# Patient Record
Sex: MALE | Race: WHITE | NOT HISPANIC OR LATINO | Employment: FULL TIME | URBAN - METROPOLITAN AREA
[De-identification: names, ages, dates, MRNs, and addresses within clinical notes are randomized per-mention and may not be internally consistent; named-entity substitution may affect disease eponyms.]

---

## 2019-08-05 ASSESSMENT — ANXIETY QUESTIONNAIRES
5. BEING SO RESTLESS THAT IT IS HARD TO SIT STILL: NOT AT ALL
GAD7 TOTAL SCORE: 0
1. FEELING NERVOUS, ANXIOUS, OR ON EDGE: NOT AT ALL
6. BECOMING EASILY ANNOYED OR IRRITABLE: NOT AT ALL
2. NOT BEING ABLE TO STOP OR CONTROL WORRYING: NOT AT ALL
7. FEELING AFRAID AS IF SOMETHING AWFUL MIGHT HAPPEN: NOT AT ALL
4. TROUBLE RELAXING: NOT AT ALL
3. WORRYING TOO MUCH ABOUT DIFFERENT THINGS: NOT AT ALL
GAD7 TOTAL SCORE: 0
7. FEELING AFRAID AS IF SOMETHING AWFUL MIGHT HAPPEN: NOT AT ALL

## 2019-08-05 ASSESSMENT — ENCOUNTER SYMPTOMS
RECTAL PAIN: 0
ARTHRALGIAS: 0
MUSCLE WEAKNESS: 0
HEARTBURN: 1
VOMITING: 0
MYALGIAS: 0
BLOOD IN STOOL: 0
JAUNDICE: 0
DIARRHEA: 0
STIFFNESS: 0
BOWEL INCONTINENCE: 0
BACK PAIN: 0
MUSCLE CRAMPS: 0
JOINT SWELLING: 0
NECK PAIN: 1
BLOATING: 0
NAUSEA: 0
ABDOMINAL PAIN: 0
CONSTIPATION: 0

## 2019-08-06 ENCOUNTER — VIRTUAL VISIT (OUTPATIENT)
Dept: INTERNAL MEDICINE | Facility: CLINIC | Age: 49
End: 2019-08-06
Payer: COMMERCIAL

## 2019-08-06 DIAGNOSIS — Z00.00 ENCOUNTER FOR PREVENTIVE HEALTH EXAMINATION: Primary | ICD-10-CM

## 2019-08-06 RX ORDER — CETIRIZINE HYDROCHLORIDE 10 MG/1
TABLET ORAL EVERY 24 HOURS
COMMUNITY
Start: 2010-08-10

## 2019-08-06 ASSESSMENT — ANXIETY QUESTIONNAIRES: GAD7 TOTAL SCORE: 0

## 2019-08-06 NOTE — PROGRESS NOTES
Health Maintenance:  Do you have a PCP? No  When was your last visit with your PCP?   When was your last eye exam? 6 months  Have you ever had a colonoscopy? No  If yes, when?   Have you ever had any polyps removed?     30+ Pack Year Smoking History:  Have you ever had a chest CT to screen for cancer? No    If Male:  (65 years old+ and tobacco use) Have you ever completed an ultrasound of your abdominal aorta? No    As part of your visit we will set up a DEXA scan which will measure your body composition. We have a few questions that need to be answered before we can schedule this scan:   What is your approximate weight? 165   Have you ever had a DEXA scan within the past 2 years? No   Will you have any other imaging studies with contrast (x-ray, CT scan) within 7  days of this appointment? No   Have you had any spine or hip surgery? No   Do you take any vitamins that contain calcium or antacids with calcium? No    If yes, stop taking 24 hours prior to visit.     Goals for the Visit:  1. Thorough Comprehensive Preventative Exam  2. Shingles outbreak 2 years ago  3.   Pertinent past Medical/Family and Social HX:   Pertinent sx that desire are addressed with this visit: Neck Pain    Instructions prior to appointment:   1. Fast beginning at 10 pm for lab appointment  2. If your preventive care assessment package includes a Fitness Assessment, please bring athletic shoes. Complementary [facility] fitness attire is provided and yours to keep.  3. If eye exam, eyes may be dilated, it will last 4-6 hours, may want to bring sunglasses.   4. May bring laptop or other work materials for use during downtime.   5. You will receive an email about 3 days prior to your visit with a final itinerary, menu selections for the complementary breakfast and lunch and instructions for the visit.     Complimentary  Parking provided. Drop off car in front [facility], take the patient elevators  to the [facility]. When you enter in the lobby, identify yourself as an [facility] [atient and you will be escorted up to the clinic.   If questions arise prior to your appointment please contact the clinic at [phone].

## 2019-08-14 ENCOUNTER — APPOINTMENT (OUTPATIENT)
Dept: INTERNAL MEDICINE | Facility: CLINIC | Age: 49
End: 2019-08-14
Payer: COMMERCIAL

## 2019-08-14 ENCOUNTER — OFFICE VISIT (OUTPATIENT)
Dept: AUDIOLOGY | Facility: CLINIC | Age: 49
End: 2019-08-14
Payer: COMMERCIAL

## 2019-08-14 ENCOUNTER — OFFICE VISIT (OUTPATIENT)
Dept: INTERNAL MEDICINE | Facility: CLINIC | Age: 49
End: 2019-08-14
Payer: COMMERCIAL

## 2019-08-14 ENCOUNTER — OFFICE VISIT (OUTPATIENT)
Dept: GASTROENTEROLOGY | Facility: CLINIC | Age: 49
End: 2019-08-14
Payer: COMMERCIAL

## 2019-08-14 ENCOUNTER — ANCILLARY PROCEDURE (OUTPATIENT)
Dept: BONE DENSITY | Facility: CLINIC | Age: 49
End: 2019-08-14
Payer: COMMERCIAL

## 2019-08-14 ENCOUNTER — OFFICE VISIT (OUTPATIENT)
Dept: DERMATOLOGY | Facility: CLINIC | Age: 49
End: 2019-08-14
Payer: COMMERCIAL

## 2019-08-14 VITALS
DIASTOLIC BLOOD PRESSURE: 73 MMHG | BODY MASS INDEX: 22.76 KG/M2 | WEIGHT: 159 LBS | OXYGEN SATURATION: 99 % | HEART RATE: 71 BPM | RESPIRATION RATE: 16 BRPM | SYSTOLIC BLOOD PRESSURE: 113 MMHG | TEMPERATURE: 97.7 F | HEIGHT: 70 IN

## 2019-08-14 DIAGNOSIS — L81.4 SOLAR LENTIGINOSIS: Primary | ICD-10-CM

## 2019-08-14 DIAGNOSIS — Z00.00 ENCOUNTER FOR PREVENTIVE HEALTH EXAMINATION: Primary | ICD-10-CM

## 2019-08-14 DIAGNOSIS — Z82.49 FAMILY HISTORY OF ISCHEMIC HEART DISEASE: ICD-10-CM

## 2019-08-14 DIAGNOSIS — K21.9 GASTROESOPHAGEAL REFLUX DISEASE, ESOPHAGITIS PRESENCE NOT SPECIFIED: ICD-10-CM

## 2019-08-14 DIAGNOSIS — Z12.83 SKIN CANCER SCREENING: ICD-10-CM

## 2019-08-14 DIAGNOSIS — Z00.00 ENCOUNTER FOR PREVENTIVE HEALTH EXAMINATION: ICD-10-CM

## 2019-08-14 DIAGNOSIS — R10.13 DYSPEPSIA: ICD-10-CM

## 2019-08-14 DIAGNOSIS — Z71.82 EXERCISE COUNSELING: Primary | ICD-10-CM

## 2019-08-14 DIAGNOSIS — H90.6 MIXED HEARING LOSS, BILATERAL: Primary | ICD-10-CM

## 2019-08-14 DIAGNOSIS — Z98.890 S/P SINUS SURGERY: ICD-10-CM

## 2019-08-14 DIAGNOSIS — J30.9 ALLERGIC RHINITIS, UNSPECIFIED SEASONALITY, UNSPECIFIED TRIGGER: ICD-10-CM

## 2019-08-14 DIAGNOSIS — Z86.69 HISTORY OF MIGRAINE: ICD-10-CM

## 2019-08-14 DIAGNOSIS — D22.9 MULTIPLE PIGMENTED NEVI: ICD-10-CM

## 2019-08-14 DIAGNOSIS — Z90.89 STATUS POST TONSILLECTOMY: ICD-10-CM

## 2019-08-14 DIAGNOSIS — Z86.19 HISTORY OF HERPES ZOSTER: ICD-10-CM

## 2019-08-14 LAB
ALBUMIN UR-MCNC: NEGATIVE MG/DL
ALP SERPL-CCNC: 61 U/L (ref 40–150)
ALT SERPL W P-5'-P-CCNC: 25 U/L (ref 0–70)
APPEARANCE UR: CLEAR
BASOPHILS # BLD AUTO: 0 10E9/L (ref 0–0.2)
BASOPHILS NFR BLD AUTO: 1.1 %
BILIRUB UR QL STRIP: NEGATIVE
CHOLEST SERPL-MCNC: 209 MG/DL
COLOR UR AUTO: YELLOW
CREAT SERPL-MCNC: 1.04 MG/DL (ref 0.66–1.25)
DEPRECATED CALCIDIOL+CALCIFEROL SERPL-MC: 41 UG/L (ref 20–75)
DIFFERENTIAL METHOD BLD: ABNORMAL
EOSINOPHIL # BLD AUTO: 0.1 10E9/L (ref 0–0.7)
EOSINOPHIL NFR BLD AUTO: 1.6 %
ERYTHROCYTE [DISTWIDTH] IN BLOOD BY AUTOMATED COUNT: 13 % (ref 10–15)
GFR SERPL CREATININE-BSD FRML MDRD: 85 ML/MIN/{1.73_M2}
GLUCOSE SERPL-MCNC: 86 MG/DL (ref 70–99)
GLUCOSE UR STRIP-MCNC: NEGATIVE MG/DL
HCT VFR BLD AUTO: 47.1 % (ref 40–53)
HDLC SERPL-MCNC: 67 MG/DL
HGB BLD-MCNC: 15.7 G/DL (ref 13.3–17.7)
HGB UR QL STRIP: NEGATIVE
HIV 1+2 AB+HIV1 P24 AG SERPL QL IA: NONREACTIVE
IMM GRANULOCYTES # BLD: 0 10E9/L (ref 0–0.4)
IMM GRANULOCYTES NFR BLD: 0 %
KETONES UR STRIP-MCNC: NEGATIVE MG/DL
LDLC SERPL CALC-MCNC: 105 MG/DL
LEUKOCYTE ESTERASE UR QL STRIP: NEGATIVE
LYMPHOCYTES # BLD AUTO: 1.4 10E9/L (ref 0.8–5.3)
LYMPHOCYTES NFR BLD AUTO: 36.7 %
MCH RBC QN AUTO: 31.7 PG (ref 26.5–33)
MCHC RBC AUTO-ENTMCNC: 33.3 G/DL (ref 31.5–36.5)
MCV RBC AUTO: 95 FL (ref 78–100)
MONOCYTES # BLD AUTO: 0.5 10E9/L (ref 0–1.3)
MONOCYTES NFR BLD AUTO: 12.4 %
MUCOUS THREADS #/AREA URNS LPF: PRESENT /LPF
NEUTROPHILS # BLD AUTO: 1.8 10E9/L (ref 1.6–8.3)
NEUTROPHILS NFR BLD AUTO: 48.2 %
NITRATE UR QL: NEGATIVE
NONHDLC SERPL-MCNC: 141 MG/DL
NRBC # BLD AUTO: 0 10*3/UL
NRBC BLD AUTO-RTO: 0 /100
PH UR STRIP: 6 PH (ref 5–7)
PLATELET # BLD AUTO: 174 10E9/L (ref 150–450)
PSA SERPL-ACNC: 1.06 UG/L (ref 0–4)
RBC # BLD AUTO: 4.95 10E12/L (ref 4.4–5.9)
RBC #/AREA URNS AUTO: 0 /HPF (ref 0–2)
SOURCE: ABNORMAL
SP GR UR STRIP: 1.01 (ref 1–1.03)
SQUAMOUS #/AREA URNS AUTO: <1 /HPF (ref 0–1)
TRIGL SERPL-MCNC: 181 MG/DL
TSH SERPL DL<=0.005 MIU/L-ACNC: 2.28 MU/L (ref 0.4–4)
UROBILINOGEN UR STRIP-MCNC: 0 MG/DL (ref 0–2)
WBC # BLD AUTO: 3.8 10E9/L (ref 4–11)
WBC #/AREA URNS AUTO: <1 /HPF (ref 0–5)

## 2019-08-14 ASSESSMENT — PAIN SCALES - GENERAL
PAINLEVEL: NO PAIN (0)
PAINLEVEL: NO PAIN (0)

## 2019-08-14 ASSESSMENT — MIFFLIN-ST. JEOR: SCORE: 1602.47

## 2019-08-14 NOTE — LETTER
8/14/2019    RE: Osito Michelle  [address] [facility] [address]     Physical Fitness Assessment:    See Fitness Action Plan for information.    Khadar Arias, PhD.  Exercise Physiologist  Fitness     Khadar Arias, PhD

## 2019-08-14 NOTE — LETTER
8/14/2019     RE: Osito Michelle  [address]  [facility] [address]     Dear Colleague,    Thank you for referring your patient, Osito Michelle, to the [facility]. Please see a copy of my visit note below.    Physical Fitness Assessment:    See Fitness Action Plan for information.    Khadar Arias, PhD.  Exercise Physiologist  Fitness     Again, thank you for allowing me to participate in the care of your patient.      Sincerely,    Khadar Arias, PhD

## 2019-08-14 NOTE — NURSING NOTE
Chief Complaint   Patient presents with     Physical     Patient is here for annual physical     Kami Molina CMA 7:03 AM on 8/14/2019.

## 2019-08-14 NOTE — PROGRESS NOTES
" History and Physical Examination     SUBJECTIVE: Chief complaint: preventive health review.     Past Medical History:  1.  GERD  2.  Allergic rhinitis and conjunctivitis  3.  History of recurrent sinusitis, status post sinus surgery with septoplasty, circa   4.  History of perforated left tympanic membrane, managed conservatively  5.  History of migraines with isolated ocular as well as headache episodes  6.  Status post tonsillectomy, childhood  7.  History of zoster during period of pronounced stress  8.  Erectile dysfunction    Adverse Drug Reactions: Penicillin (urticaria)     Current Medications:  Unspecified probiotic supplement, one capsule daily  Cetirizine, 10 mg daily as needed  Fluticasone nasal inhaler, 1-2 sprays to each nostril daily as needed  Tadalafil, 2.5-10 mg daily as needed  Ranitidine, 150 mg twice a day  Fish oil, 2 capsules daily  Daily multivitamin supplement    Habits:  Tobacco: Never  Alcohol: 3-4 servings per day with infrequent use of larger quantities  Caffeine: 2 servings per day of coffee or tea     Social History:  in 2017 and father of 2 children: daughter Shalonda, age 17, who plans to attend [employer] after completing high school next spring; and son Yan, age 15, who enjoys football, basketball, and golf.  Osito was born in [place], raised in [place], and attended the [employer] in [place] for his BA and K[employer] degrees.  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  He enjoys spending time with his girlfriend, playing hockey, and biking.  He exercises 6 days per week, playing hockey once per week, \"spinning\" on a [employer] cycle, and working out with weights.  He plays golf occasionally.    Family History: Father  in 2017 at age 71, with history of coronary artery disease (first " myocardial infarction in his 40s), cognitive decline attributed to multi-infarct infarct dementia, hypertension, dyslipidemia, and tobacco use.  Mother is 72, with history of irritable bowel syndrome and tobacco use.  Limited contact with brother 4 years his senior, who has an unspecified dysrhythmia and history of tobacco, alcohol, and street drug abuse.  A sister 5 years his lavelle has unspecified mild valvular disorder and overweight.  Medical history of grandparents notable only for paternal grandfather's death from cancer of the duodenum.  No family history of colon cancer or polyps.     Review of Systems: Infrequent neck discomfort, managed with chiropractic manipulation and attention to posture.  History of zoster in 2017, during a period of pronounced stress related to divorce proceedings and his father's illness and eventual death.  He reports that he has noted recurrence of symptoms of GERD on the several occasions when he discontinued use of ranitidine.  No history of either EGD or colonoscopy.  Most recent tetanus (Tdap) was administered in 12/2009.  Remainder of complete review of systems was negative.     OBJECTIVE:     Vital signs: Height 70 inches.  Weight 159 pounds.  Blood pressure 116/77 on average of 3 automated readings.  Heart rate 71.  Respiratory rate 16.  Temperature 97.7 degrees.  O2 saturation 99% on room air.  General: Alert, neatly dressed and groomed, in no acute distress.  HEENT: Atraumatic and normocephalic. Eyelids, pupils, and conjunctivae appeared normal. Lips, teeth and gums appear normal.  Oropharynx showed moist mucous membranes, without exudate or erythema.  Neck: Supple, without thyromegaly, mass, or bruit. No cervical or supraclavicular lymphadenopathy.  Back: No spinal or costovertebral angle tenderness.  Chest: Clear to auscultation and percussion. Normal respiratory effort.  Cardiovascular: No jugular venous distention. Regular rate and rhythm, normal S1, S2 without  "murmur.  Abdomen: Bowel sounds positive; soft, nontender, without rebound, guarding, hepatosplenomegaly or mass.  Extremities: No cyanosis or edema.  Genitalia: Normal male genitalia, without scrotal mass or hernia. No inguinal lymphadenopathy.  Rectal: Normal tone, with smooth, nontender, nonenlarged prostate. No rectal mass.  Skin: Examination was deferred; full evaluation was completed earlier in day through dermatology clinic.  Neurologic: Cranial nerves II-XII were grossly intact. Sensory and motor examinations were normal. Normal gait.  Mini-Cog score was 5/5.  Psychiatric: Alert and oriented ×3. Normal affect. Judgment and insight intact.    Creatinine 1.04, alkaline phosphatase 61, ALT 25, cholesterol 209, triglycerides 181, HDL 67, , cholesterol/HDL 3.1, TSH 2.28, 25-hydroxy vitamin D 41, PSA 1.06, glucose 86, white blood cell count 3800, ANC 1800, hemoglobin 15.7, platelets 174,000, HIV nonreactive, urinalysis unremarkable.    EKG was normal.  Spirometry showed an FEV1 of 4.22, with an FVC of 5.88; readings were 105% and 115% of predicted values, respectively.    Preliminary DEXA results show normal bone density, with most negative and valid Z-score of -0.4 at the level of the left femoral neck.  Body composition analysis showed 14.8% fat (5th percentile); body mass index was 22.8.     ASSESSMENT:    1.  GERD.  We reviewed the usual \"anti-reflux\" measures, which he was encouraged to follow.  Based on his report of long-standing symptoms, with recurrence with multiple attempts to discontinue treatment, I recommended Helicobacter pylori testing followed by EGD.  Further recommendations will be based on those results.    2.  Family history of premature coronary artery disease.  Using guidelines from the American Heart Association and American College of Cardiology, his estimated 10-year risk of a vascular event is 1.6% (optimal for his age/gender cohort is 1.5%).  He was congratulated for his regimen " of regular exercise, encouraged to maintain ideal weight, and advised to follow a healthful diet, the elements of which were reviewed.    3.  History of zoster.  No evidence of impaired fasting glucose or significant immunosuppression.  Episode occurred during a period of enormous stress related to his divorce and his father's declining health and death.  We discussed the availability of a recombinant zoster vaccination, which I recommended at age 52.    4.  Preventive care.  Tetanus (Td) vaccination was provided at the time of his appointment, bringing his immunization status up-to-date.  We reviewed the elements of a healthful diet.  I recommended daily use of a 1000-international unit vitamin D3 supplement.  We reviewed the debate regarding benefits of multivitamin supplements and the importance of selecting a product that does not contain iron should he choose to continue.     PLAN: See above.     ~SRT

## 2019-08-14 NOTE — PROGRESS NOTES
[facility] Dermatology Note      Dermatology Problem List:  1.Skin cancer screening 8/14/19, unremarkable    Encounter Date: Aug 14, 2019    CC:  Chief Complaint   Patient presents with     Skin Check     Skin check, no concerns noted.         History of Present Illness:  Mr. Osito Michelle is a 49 year old male who presents as a new patient in the dermatology clinic today for a skin cancer screening.    Today he reports that there are not particular lesions of concern at this time, but he would like to have his skin examined as part of his yearly wellness day. He has been seen by dermatologists at outside clinics and notes that he has had moles removed but they were removed for cosmetic reasons. He received significant sun exposure in his youth but currently applies sunscreen (SPF 30) diligently with reapplication. His father had some lesions removed from his face that was precancerous in nature.     Otherwise he is feeling well, without additional skin concerns at this time.    Past Medical History:   Patient Active Problem List   Diagnosis     Allergic rhinitis     Esophageal reflux     ACP (advance care planning)     Health Care Home     Male erectile disorder     Past Medical History:   Diagnosis Date     Gastroesophageal reflux disease 2014     Migraines 2018    Had only 3 occurances     Other acne      Past Surgical History:   Procedure Laterality Date     HC CREATE EARDRUM OPENING,GEN ANESTH       HC CREATE EARDRUM OPENING,GEN ANESTH       HC REMOVE TONSILS/ADENOIDS,<15 Y/O      T & A <14y.o.       Social History:   reports that he has never smoked. He has never used smokeless tobacco. He reports that he drinks about 6.0 oz of alcohol per week. He reports that he does not use drugs.   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 Father       "Myocardial Infarction Father      Hyperlipidemia Father      Cerebrovascular Disease Father         Mini strokes     Cardiovascular Brother         rhythym     Cancer Paternal Grandfather      Stomach Cancer Paternal Grandfather      Cancer - colorectal No family hx of      Prostate Cancer No family hx of    Father had \"precancerous\" lesions removed from the face.     Medications:  Current Outpatient Medications   Medication Sig Dispense Refill     adapalene (DIFFERIN) 0.1 % gel   0     cetirizine (ZYRTEC) 10 MG tablet Take by mouth every 24 hours       fluticasone (FLONASE) 50 MCG/ACT nasal spray Spray 2 sprays into both nostrils daily 1 Package 11     Multiple Vitamin (ONE-A-DAY MENS) TABS Take 1 tablet by mouth daily.       ranitidine (ZANTAC) 150 MG tablet Take 150 mg by mouth 2 times daily       tadalafil (CIALIS) 10 MG tablet Take 0.5-1 tablets (5-10 mg) by mouth daily as needed for erectile dysfunction Never use with nitroglycerin, terazosin or doxazosin. 20 tablet 3       Allergies   Allergen Reactions     Penicillin V        Review of Systems:  -As per HPI  -Constitutional: The patient denies fatigue, fevers, chills, unintended weight loss, and night sweats.  -HEENT: Patient denies nonhealing oral sores.  -Skin: As above in HPI. No additional skin concerns.    Physical exam:  Vitals: There were no vitals taken for this visit.  GEN: This is a well developed, well-nourished male in no acute distress, in a pleasant mood.    SKIN: Full skin, which includes the head/face, both arms, chest, back, abdomen,both legs, genitalia and/or groin buttocks, digits and/or nails, was examined.  -Multiple regular brown pigmented macules and papules are identified on the trunk and extremities.   -There are a few light brown macules scattered to sun exposed areas.   -No other lesions of concern on areas examined.       Impression/Plan:  1. Multiple pigmented nevi on the trunk and extremities, some appearing slightly atypical on " the abdomen, patient will monitor.     Continue to clinically monitor    Benign nature reassured, no further intervention required at this time. ABCDES discussed.   2. Solar lentigines.     Sun precaution was advised including the use of sun screens of SPF 30 or higher, sun protective clothing, and avoidance of tanning beds.      Follow-up in 1 year, earlier for new or changing lesions.       Staff Involved:  Staff/Scribe    Scribe Disclosure:  IJason am serving as a scribe to document services personally performed by Rosalba Maya PA-C based on data collection and the provider's statements to me.     Provider Disclosure:   The documentation recorded by the scribe accurately reflects the services I personally performed and the decisions made by me.    All risks, benefits and alternatives were discussed with patient.  Patient is in agreement and understands the assessment and plan.  All questions were answered.  Sun Screen Education was given.   Return to Clinic annually or sooner as needed.   Rosalba Maya PA-C   [facility]

## 2019-08-14 NOTE — NURSING NOTE
AHA BP    1st   118/78  2nd  117/80  3rd   113/73    Average   116/77  Kami Molina [facility] 7:26 AM on 8/14/2019.

## 2019-08-14 NOTE — NURSING NOTE
Dermatology Rooming Note    Osito Michelle's goals for this visit include:   Chief Complaint   Patient presents with     Skin Check     Skin check, no concerns noted.     Bess Coronel LPN

## 2019-08-14 NOTE — LETTER
"8/14/2019    RE: Osito Michelle  [address] [facility] [address]      History and Physical Examination     SUBJECTIVE: Chief complaint: preventive health review.     Past Medical History:  1.  GERD  2.  Allergic rhinitis and conjunctivitis  3.  History of recurrent sinusitis, status post sinus surgery with septoplasty, circa 2016  4.  History of perforated left tympanic membrane, managed conservatively  5.  History of migraines with isolated ocular as well as headache episodes  6.  Status post tonsillectomy, childhood  7.  History of zoster during period of pronounced stress  8.  Erectile dysfunction    Adverse Drug Reactions: Penicillin (urticaria)     Current Medications:  Unspecified probiotic supplement, one capsule daily  Cetirizine, 10 mg daily as needed  Fluticasone nasal inhaler, 1-2 sprays to each nostril daily as needed  Tadalafil, 2.5-10 mg daily as needed  Ranitidine, 150 mg twice a day  Fish oil, 2 capsules daily  Daily multivitamin supplement    Habits:  Tobacco: Never  Alcohol: 3-4 servings per day with infrequent use of larger quantities  Caffeine: 2 servings per day of coffee or tea     Social History:  in 2017 and father of 2 children: daughter Shalonda, age 17, who plans to attend cosmetology school after completing high school next spring; and son Yan, age 15, who enjoys football, basketball, and golf.  Osito was born in [place], raised in [place], and attended the [employer] in [place] for his BA and KODI degrees.  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  He enjoys spending time with his girlfriend, playing hockey, and biking.  He exercises 6 days per week, playing hockey once per week, \"spinning\" on a [employer] cycle, and working out with weights.  He plays golf occasionally.    Family History: Father "  in 2017 at age 71, with history of coronary artery disease (first myocardial infarction in his 40s), cognitive decline attributed to multi-infarct infarct dementia, hypertension, dyslipidemia, and tobacco use.  Mother is 72, with history of irritable bowel syndrome and tobacco use.  Limited contact with brother 4 years his senior, who has an unspecified dysrhythmia and history of tobacco, alcohol, and street drug abuse.  A sister 5 years his lavelle has unspecified mild valvular disorder and overweight.  Medical history of grandparents notable only for paternal grandfather's death from cancer of the duodenum.  No family history of colon cancer or polyps.     Review of Systems: Infrequent neck discomfort, managed with chiropractic manipulation and attention to posture.  History of zoster in 2017, during a period of pronounced stress related to divorce proceedings and his father's illness and eventual death.  He reports that he has noted recurrence of symptoms of GERD on the several occasions when he discontinued use of ranitidine.  No history of either EGD or colonoscopy.  Most recent tetanus (Tdap) was administered in 2009.  Remainder of complete review of systems was negative.     OBJECTIVE:     Vital signs: Height 70 inches.  Weight 159 pounds.  Blood pressure 116/77 on average of 3 automated readings.  Heart rate 71.  Respiratory rate 16.  Temperature 97.7 degrees.  O2 saturation 99% on room air.  General: Alert, neatly dressed and groomed, in no acute distress.  HEENT: Atraumatic and normocephalic. Eyelids, pupils, and conjunctivae appeared normal. Lips, teeth and gums appear normal.  Oropharynx showed moist mucous membranes, without exudate or erythema.  Neck: Supple, without thyromegaly, mass, or bruit. No cervical or supraclavicular lymphadenopathy.  Back: No spinal or costovertebral angle tenderness.  Chest: Clear to auscultation and percussion. Normal respiratory effort.  Cardiovascular: No jugular  "venous distention. Regular rate and rhythm, normal S1, S2 without murmur.  Abdomen: Bowel sounds positive; soft, nontender, without rebound, guarding, hepatosplenomegaly or mass.  Extremities: No cyanosis or edema.  Genitalia: Normal male genitalia, without scrotal mass or hernia. No inguinal lymphadenopathy.  Rectal: Normal tone, with smooth, nontender, nonenlarged prostate. No rectal mass.  Skin: Examination was deferred; full evaluation was completed earlier in day through dermatology clinic.  Neurologic: Cranial nerves II-XII were grossly intact. Sensory and motor examinations were normal. Normal gait.  Mini-Cog score was 5/5.  Psychiatric: Alert and oriented ×3. Normal affect. Judgment and insight intact.    Creatinine 1.04, alkaline phosphatase 61, ALT 25, cholesterol 209, triglycerides 181, HDL 67, , cholesterol/HDL 3.1, TSH 2.28, 25-hydroxy vitamin D 41, PSA 1.06, glucose 86, white blood cell count 3800, ANC 1800, hemoglobin 15.7, platelets 174,000, HIV nonreactive, urinalysis unremarkable.    EKG was normal.  Spirometry showed an FEV1 of 4.22, with an FVC of 5.88; readings were 105% and 115% of predicted values, respectively.    Preliminary DEXA results show normal bone density, with most negative and valid Z-score of -0.4 at the level of the left femoral neck.  Body composition analysis showed 14.8% fat (5th percentile); body mass index was 22.8.     ASSESSMENT:    1.  GERD.  We reviewed the usual \"anti-reflux\" measures, which he was encouraged to follow.  Based on his report of long-standing symptoms, with recurrence with multiple attempts to discontinue treatment, I recommended Helicobacter pylori testing followed by EGD.  Further recommendations will be based on those results.    2.  Family history of premature coronary artery disease.  Using guidelines from the American Heart Association and American College of Cardiology, his estimated 10-year risk of a vascular event is 1.6% (optimal for his " age/gender cohort is 1.5%).  He was congratulated for his regimen of regular exercise, encouraged to maintain ideal weight, and advised to follow a healthful diet, the elements of which were reviewed.    3.  History of zoster.  No evidence of impaired fasting glucose or significant immunosuppression.  Episode occurred during a period of enormous stress related to his divorce and his father's declining health and death.  We discussed the availability of a recombinant zoster vaccination, which I recommended at age 52.    4.  Preventive care.  Tetanus (Td) vaccination was provided at the time of his appointment, bringing his immunization status up-to-date.  We reviewed the elements of a healthful diet.  I recommended daily use of a 1000-international unit vitamin D3 supplement.  We reviewed the debate regarding benefits of multivitamin supplements and the importance of selecting a product that does not contain iron should he choose to continue.     PLAN: See above.     ~SRT    Fabrizio Good MD

## 2019-08-14 NOTE — PATIENT INSTRUCTIONS
It was nice meeting you today.    Below are some nutrition recommendations that we discussed at your visit today:    1. Decrease and Limit alcoholic beverage to 2 drinks or less per day.    2. Increase water to consistently drink at least 48 oz-64 oz (6-8 cups) per day.    3. Increase fruit and vegetable intake to ensure that you have at least one serving of either or both at each meal.    -Add some fruit to your yogurt in the morning at breakfast.     -Consistently include some non-starchy vegetables at  dinner along with your meat and potatoes.    4. Continue eating 3 meals per day and a healthy snack such as nuts daily.    5. Continue daily probiotic especially since you have noticed an improvement in your symptoms.    6. If you find that your symptoms of loose stool and GERD return, worsen, you can keep daily food and beverage journals and track symptoms. Can keep journals using an daljit such as My Symptoms daljit or Mary Ann daljit or other daljit of your choice. Or can keep journals in a notebook or online option as well.    Bess Andrade, MS, RD, LD

## 2019-08-14 NOTE — LETTER
8/14/2019       RE: Osito Michelle  [address]  [facility] [address]     Dear Colleague,    Thank you for referring your patient, Osito Michelle, to the [facility] EXECUTIVE [facility] at [facility]. Please see a copy of my visit note below.     History and Physical Examination     SUBJECTIVE: Chief complaint: preventive health review.     Past Medical History:  1.  GERD  2.  Allergic rhinitis and conjunctivitis  3.  History of recurrent sinusitis, status post sinus surgery with septoplasty, circa 2016  4.  History of perforated left tympanic membrane, managed conservatively  5.  History of migraines with isolated ocular as well as headache episodes  6.  Status post tonsillectomy, childhood  7.  History of zoster during period of pronounced stress  8.  Erectile dysfunction    Adverse Drug Reactions: Penicillin (urticaria)     Current Medications:  Unspecified probiotic supplement, one capsule daily  Cetirizine, 10 mg daily as needed  Fluticasone nasal inhaler, 1-2 sprays to each nostril daily as needed  Tadalafil, 2.5-10 mg daily as needed  Ranitidine, 150 mg twice a day  Fish oil, 2 capsules daily  Daily multivitamin supplement    Habits:  Tobacco: Never  Alcohol: 3-4 servings per day with infrequent use of larger quantities  Caffeine: 2 servings per day of coffee or tea     Social History:  in 2017 and father of 2 children: daughter Shalonda, age 17, who plans to attend cosmetology school after completing high school next spring; and son Yan, age 15, who enjoys football, basketball, and golf.  Osito was born in [place], raised in [place], and attended the [employer] in [place] in [place] for his BA and K[employer] degrees.  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  He enjoys spending time with his girlfriend,  "playing hockey, and biking.  He exercises 6 days per week, playing hockey once per week, \"spinning\" on a [employer] cycle, and working out with weights.  He plays golf occasionally.    Family History: Father  in 2017 at age 71, with history of coronary artery disease (first myocardial infarction in his 40s), cognitive decline attributed to multi-infarct infarct dementia, hypertension, dyslipidemia, and tobacco use.  Mother is 72, with history of irritable bowel syndrome and tobacco use.  Limited contact with brother 4 years his senior, who has an unspecified dysrhythmia and history of tobacco, alcohol, and street drug abuse.  A sister 5 years his lavelle has unspecified mild valvular disorder and overweight.  Medical history of grandparents notable only for paternal grandfather's death from cancer of the duodenum.  No family history of colon cancer or polyps.     Review of Systems: Infrequent neck discomfort, managed with chiropractic manipulation and attention to posture.  History of zoster in 2017, during a period of pronounced stress related to divorce proceedings and his father's illness and eventual death.  He reports that he has noted recurrence of symptoms of GERD on the several occasions when he discontinued use of ranitidine.  No history of either EGD or colonoscopy.  Most recent tetanus (Tdap) was administered in 2009.  Remainder of complete review of systems was negative.     OBJECTIVE:     Vital signs: Height 70 inches.  Weight 159 pounds.  Blood pressure 116/77 on average of 3 automated readings.  Heart rate 71.  Respiratory rate 16.  Temperature 97.7 degrees.  O2 saturation 99% on room air.  General: Alert, neatly dressed and groomed, in no acute distress.  HEENT: Atraumatic and normocephalic. Eyelids, pupils, and conjunctivae appeared normal. Lips, teeth and gums appear normal.  Oropharynx showed moist mucous membranes, without exudate or erythema.  Neck: Supple, without thyromegaly, mass, or " "bruit. No cervical or supraclavicular lymphadenopathy.  Back: No spinal or costovertebral angle tenderness.  Chest: Clear to auscultation and percussion. Normal respiratory effort.  Cardiovascular: No jugular venous distention. Regular rate and rhythm, normal S1, S2 without murmur.  Abdomen: Bowel sounds positive; soft, nontender, without rebound, guarding, hepatosplenomegaly or mass.  Extremities: No cyanosis or edema.  Genitalia: Normal male genitalia, without scrotal mass or hernia. No inguinal lymphadenopathy.  Rectal: Normal tone, with smooth, nontender, nonenlarged prostate. No rectal mass.  Skin: Examination was deferred; full evaluation was completed earlier in day through dermatology clinic.  Neurologic: Cranial nerves II-XII were grossly intact. Sensory and motor examinations were normal. Normal gait.  Mini-Cog score was 5/5.  Psychiatric: Alert and oriented ×3. Normal affect. Judgment and insight intact.    Creatinine 1.04, alkaline phosphatase 61, ALT 25, cholesterol 209, triglycerides 181, HDL 67, , cholesterol/HDL 3.1, TSH 2.28, 25-hydroxy vitamin D 41, PSA 1.06, glucose 86, white blood cell count 3800, ANC 1800, hemoglobin 15.7, platelets 174,000, HIV nonreactive, urinalysis unremarkable.    EKG was normal.  Spirometry showed an FEV1 of 4.22, with an FVC of 5.88; readings were 105% and 115% of predicted values, respectively.    Preliminary DEXA results show normal bone density, with most negative and valid Z-score of -0.4 at the level of the left femoral neck.  Body composition analysis showed 14.8% fat (5th percentile); body mass index was 22.8.     ASSESSMENT:    1.  GERD.  We reviewed the usual \"anti-reflux\" measures, which he was encouraged to follow.  Based on his report of long-standing symptoms, with recurrence with multiple attempts to discontinue treatment, I recommended Helicobacter pylori testing followed by EGD.  Further recommendations will be based on those results.    2.  Family " history of premature coronary artery disease.  Using guidelines from the American Heart Association and American College of Cardiology, his estimated 10-year risk of a vascular event is 1.6% (optimal for his age/gender cohort is 1.5%).  He was congratulated for his regimen of regular exercise, encouraged to maintain ideal weight, and advised to follow a healthful diet, the elements of which were reviewed.    3.  History of zoster.  No evidence of impaired fasting glucose or significant immunosuppression.  Episode occurred during a period of enormous stress related to his divorce and his father's declining health and death.  We discussed the availability of a recombinant zoster vaccination, which I recommended at age 52.    4.  Preventive care.  Tetanus (Td) vaccination was provided at the time of his appointment, bringing his immunization status up-to-date.  We reviewed the elements of a healthful diet.  I recommended daily use of a 1000-international unit vitamin D3 supplement.  We reviewed the debate regarding benefits of multivitamin supplements and the importance of selecting a product that does not contain iron should he choose to continue.     PLAN: See above.     ~SRT    Again, thank you for allowing me to participate in the care of your patient.      Sincerely,    Fabrizio Good MD

## 2019-08-14 NOTE — PROGRESS NOTES
"[facility] Outpatient Medical Nutrition Therapy      Time Spent:  60 minutes  Session Type:  Individual Session  Referral Source: Executive Health Package  Reason for RD Visit:   Nutritional counseling     Nutrition Assessment:  Patient is here for initial visit with Registered Dietitian (WINTER).  Patient is a 49 y.o. male with history of GERD, migraines. Patient also stated that he has a history of irritable bowel syndrome and diarrhea.  He about a year ago, he started taking a probiotic supplement daily and now denies any further issues of diarrhea and stated he was amazed at how well it worked. He initially started with a prebiotic and had an increase in symptoms but now improved with probiotic. He stated that that was the only change he made in his intake. He reported eating the same foods pretty consistently and does not eat a lot in general. He stated that he does not eat too many vegetables so he likes have the frozen fruit and vegetable smoothie mix at lunch to get some intake of fruits and vegetables. He stated that he finds gluten causes some bloating so tries to eat less gluten containing ingredients but not gluten free in general. He reported eating less grains. Orange juice causes mouth sores and spicy foods as well as coffee cause GERD symptoms. He does drink a couple of cups of coffee per day but other times will have tea instead. He does not get any symptoms from tea. He exercises daily and overall feels his intake is healthy/eats a healthy diet. His sister owns a [employer] factory, so it is common for him to drink at least 3-4 alcoholic beverages per day (wine or old fashions). In summer he may drink more than that especially if on his boat.    Height: 70\"  Weight:  159 lbs (72 kg)  BMI: 22.8    Diet Tu0nyhe:  (usual intake)  Meal Food    Breakfast Greek or Pakistani yogurt with granola   Lunch Daily harvest frozen fruit and veggie smoothie mix and blends with oat meal and whey protein isolate " powder   Dinner Lean protein (chicken/lean pork/fish/seafood) with baked or mashed potato, sometimes a little vegetable but not regularly   Snacks Between lunch and dinner: handful of Raw almonds and pistaschios   Beverages 32 oz-48 oz water, 2 cups black coffee or tea with agave syrup   Alcohol Intake 3-4 glasses of wine or old fashions per day, sometimes more per day on his boat and in summer.      Labs:  On 8/14/19: Chol (209), LDL (105), triglycerides (181) and WBC (3.8). Others reviewed.  Pertinent Medications/vitamin and mineral supplements:   Tashia's probiotics, fish oil and multivitamin.   Food Allergies:  NKFA  Physical Activity:  Exercises everyday: 1x/week plays hockey, 1x/week uses his [employer] bike, 4x/week lifts weight and 1x/week walks/dog walks.  Estimated Nutrition Needs based on current body weight of 72:   3937-7520 calories (25-30 kcals/kg), 72g protein (1g/kg), ~1 ml/kcal or total fluids per MD.    Nutrition Diagnosis:    Food and nutrition related knowledge deficit related to lack of previous/complete recall of previous diet education for general healthful diet and pt report of hx IBS/diarrhea as evidenced by pt report and interest in diet education.    Nutrition Prescription: Recommended general healthful diet     Nutrition Intervention:    Nutrition Education/Counseling:  Provided general healthful diet nutrition education. Recommended eating balanced meals. Reviewed the Plate Method meal plan/handout and gave pt tips and suggestions for adding foods to his meals to get balanced meals with a variety of food groups. Encouraged him to include some fruits and vegetables at all meals. Told pt he can add some fruit to his yogurt and granola in the morning and consistently include some vegetables at dinner meals. Reviewed food groups with some example foods in groups. Reviewed the difference between unsaturated and saturated fats with recommendation to aim for choosing unsaturated fat over saturated  fats when possible. Additionally encouraged pt to eat adequate fiber and discussed some food sources containing fiber. Explained the difference between soluble and insoluble fiber in the role of GI health as well as role of soluble fiber for heart health in general.  Discussed adequate hydration/recommendations and encouraged pt to increase fluid to consistently drink at least 48 oz-64 oz (6-8 cups) per day. Additionally explained recommendation to limit alcoholic beverages to 2 standard drinks or less per day. Also explained this can contribute to GI symptoms such as loose stool and GERD.    Reviewed diet tips for irritable bowel syndrome with diarrhea/loose stool as well as GERD guidelines. Told pt he can aim for eating smaller more frequent meals if better tolerated than 3 meals. Reviewed foods that may contribute to GERD symptoms and not recommended if they are causing symptoms such as chocolate, caffeine, peppermint and spearmint, alcohol, spicy foods and fatty foods. Told patient if his symptoms return for either GERD or diarrhea, he could keep a food and beverage journal and track any GI symptoms. Discussed some ways in which he can keep journals such as in notebook or using a cell phone daljit such as My symptoms daljit or [employer] daljit or other online or daljit he preferences.    Discussed some lifestyle recommendations that may be helpful for relieving issues with GERD such as getting regular exercise at least 3-4 times per week, can raise the head of the bed by using a foam wedge under top part of mattress for example. Told patient that wearing loose fitting clothing may be more comfortable, and eating slowly, sitting while eating and eating in a calm and relaxed place is recommended. Additionally recommended not eating 3 hours before bedtime or lying down. Gave patient diet education handouts.    Answered patient's questions. He verbalized understanding of education provided.    Educational Materials Provided:  [facility] Daily Nutrition Plate method handout and GERD nutrition therapy.    Goals:  1. Decrease and Limit alcoholic beverage to 2 drinks or less per day.    2. Increase water to consistently drink at least 48 oz-64 oz (6-8 cups) per day.    3. Increase fruit and vegetable intake to ensure that you have at least one serving of either or both at each meal.    -Add some fruit to your yogurt in the morning at breakfast   -Consistently include some non starchy vegetables at dinner along with your meat and  potatoes.    4. Continue eating 3 meals per day and a healthy snack such as nuts daily.    5. Continue daily probiotic especially since you have noticed an improvement in your symptoms.    6. If you find that your symptoms of loose stool and GERD return, worsen, you can keep daily food and beverage journals and track symptoms. Can keep journals using an daljit such as My Symptoms daljit or Mary Ann daljit or other daljit of your choice. Or can keep journals in a notebook or online option as well.    Nutrition Monitoring and Evaluation: Will monitor adherence to nutrition recommendations at future RD visits.     Further Medical Nutrition Therapy:  PRN  Patient was encouraged to call/contact RD with any further questions.    Bess Andrade, MS, RD, LD

## 2019-08-14 NOTE — LETTER
8/14/2019       RE: Osito Michelle  [address]  [facility] [address]     Dear Colleague,    Thank you for referring your patient, Osito Michelle, to the [facility] GASTROENTEROLOGY AND IBD CLINIC at [facility]. Please see a copy of my visit note below.    [facility] Outpatient Medical Nutrition Therapy      Time Spent:  60 minutes  Session Type:  Individual Session  Referral Source: Executive Health Package  Reason for RD Visit:   Nutritional counseling     Nutrition Assessment:  Patient is here for initial visit with Registered Dietitian (WINTER).  Patient is a 49 y.o. male with history of GERD, migraines. Patient also stated that he has a history of irritable bowel syndrome and diarrhea.  He about a year ago, he started taking a probiotic supplement daily and now denies any further issues of diarrhea and stated he was amazed at how well it worked. He initially started with a prebiotic and had an increase in symptoms but now improved with probiotic. He stated that that was the only change he made in his intake. He reported eating the same foods pretty consistently and does not eat a lot in general. He stated that he does not eat too many vegetables so he likes have the frozen fruit and vegetable smoothie mix at lunch to get some intake of fruits and vegetables. He stated that he finds gluten causes some bloating so tries to eat less gluten containing ingredients but not gluten free in general. He reported eating less grains. Orange juice causes mouth sores and spicy foods as well as coffee cause GERD symptoms. He does drink a couple of cups of coffee per day but other times will have tea instead. He does not get any symptoms from tea. He exercises daily and overall feels his intake is healthy/eats a healthy diet. His sister owns a [employer] factory, so it is common for him to drink at least 3-4 alcoholic beverages per day (wine or old fashions). In summer he may drink more  "than that especially if on his boat.    Height: 70\"  Weight:  159 lbs (72 kg)  BMI: 22.8    Diet [id number]:  (usual intake)  Meal Food    Breakfast Greek or Monegasque yogurt with granola   Lunch Daily harvest frozen fruit and veggie smoothie mix and blends with oat meal and whey protein isolate powder   Dinner Lean protein (chicken/lean pork/fish/seafood) with baked or mashed potato, sometimes a little vegetable but not regularly   Snacks Between lunch and dinner: handful of Raw almonds and pistaschios   Beverages 32 oz-48 oz water, 2 cups black coffee or tea with agave syrup   Alcohol Intake 3-4 glasses of wine or old fashions per day, sometimes more per day on his boat and in summer.      Labs:  On 8/14/19: Chol (209), LDL (105), triglycerides (181) and WBC (3.8). Others reviewed.  Pertinent Medications/vitamin and mineral supplements:   Tashia's probiotics, fish oil and multivitamin.   Food Allergies:  NKFA  Physical Activity:  Exercises everyday: 1x/week plays hockey, 1x/week uses his [employer] bike, 4x/week lifts weight and 1x/week walks/dog walks.  Estimated Nutrition Needs based on current body weight of 72:   5340-0733 calories (25-30 kcals/kg), 72g protein (1g/kg), ~1 ml/kcal or total fluids per MD.    Nutrition Diagnosis:    Food and nutrition related knowledge deficit related to lack of previous/complete recall of previous diet education for general healthful diet and pt report of hx IBS/diarrhea as evidenced by pt report and interest in diet education.    Nutrition Prescription: Recommended general healthful diet     Nutrition Intervention:    Nutrition Education/Counseling:  Provided general healthful diet nutrition education. Recommended eating balanced meals. Reviewed the Plate Method meal plan/handout and gave pt tips and suggestions for adding foods to his meals to get balanced meals with a variety of food groups. Encouraged him to include some fruits and vegetables at all meals. Told pt he can add some " fruit to his yogurt and granola in the morning and consistently include some vegetables at dinner meals. Reviewed food groups with some example foods in groups. Reviewed the difference between unsaturated and saturated fats with recommendation to aim for choosing unsaturated fat over saturated fats when possible. Additionally encouraged pt to eat adequate fiber and discussed some food sources containing fiber. Explained the difference between soluble and insoluble fiber in the role of GI health as well as role of soluble fiber for heart health in general.  Discussed adequate hydration/recommendations and encouraged pt to increase fluid to consistently drink at least 48 oz-64 oz (6-8 cups) per day. Additionally explained recommendation to limit alcoholic beverages to 2 standard drinks or less per day. Also explained this can contribute to GI symptoms such as loose stool and GERD.    Reviewed diet tips for irritable bowel syndrome with diarrhea/loose stool as well as GERD guidelines. Told pt he can aim for eating smaller more frequent meals if better tolerated than 3 meals. Reviewed foods that may contribute to GERD symptoms and not recommended if they are causing symptoms such as chocolate, caffeine, peppermint and spearmint, alcohol, spicy foods and fatty foods. Told patient if his symptoms return for either GERD or diarrhea, he could keep a food and beverage journal and track any GI symptoms. Discussed some ways in which he can keep journals such as in notebook or using a cell phone daljit such as My symptoms daljit or Mary Ann daljit or other online or daljit he preferences.    Discussed some lifestyle recommendations that may be helpful for relieving issues with GERD such as getting regular exercise at least 3-4 times per week, can raise the head of the bed by using a foam wedge under top part of mattress for example. Told patient that wearing loose fitting clothing may be more comfortable, and eating slowly, sitting while  eating and eating in a calm and relaxed place is recommended. Additionally recommended not eating 3 hours before bedtime or lying down. Gave patient diet education handouts.    Answered patient's questions. He verbalized understanding of education provided.    Educational Materials Provided:  [employer] Daily Nutrition Plate method handout and GERD nutrition therapy.    Goals:  1. Decrease and Limit alcoholic beverage to 2 drinks or less per day.    2. Increase water to consistently drink at least 48 oz-64 oz (6-8 cups) per day.    3. Increase fruit and vegetable intake to ensure that you have at least one serving of either or both at each meal.    -Add some fruit to your yogurt in the morning at breakfast   -Consistently include some non starchy vegetables at dinner along with your meat and  potatoes.    4. Continue eating 3 meals per day and a healthy snack such as nuts daily.    5. Continue daily probiotic especially since you have noticed an improvement in your symptoms.    6. If you find that your symptoms of loose stool and GERD return, worsen, you can keep daily food and beverage journals and track symptoms. Can keep journals using an daljit such as My Symptoms daljit or Mary Ann daljit or other daljit of your choice. Or can keep journals in a notebook or online option as well.    Nutrition Monitoring and Evaluation: Will monitor adherence to nutrition recommendations at future RD visits.     Further Medical Nutrition Therapy:  PRN  Patient was encouraged to call/contact RD with any further questions.    Bess Andrade, MS, RD, LD

## 2019-08-14 NOTE — LETTER
8/14/2019       RE: Osito Michelle  [address]  [facility] [address]     Dear Colleague,    Thank you for referring your patient, Osito Michelle, to the [facility] DERMATOLOGY at [facility]. Please see a copy of my visit note below.    [facility] Dermatology Note      Dermatology Problem List:  1.Skin cancer screening 8/14/19, unremarkable    Encounter Date: Aug 14, 2019    CC:  Chief Complaint   Patient presents with     Skin Check     Skin check, no concerns noted.         History of Present Illness:  Mr. Osito Michelle is a 49 year old male who presents as a new patient in the dermatology clinic today for a skin cancer screening.    Today he reports that there are not particular lesions of concern at this time, but he would like to have his skin examined as part of his yearly wellness day. He has been seen by dermatologists at outside clinics and notes that he has had moles removed but they were removed for cosmetic reasons. He received significant sun exposure in his youth but currently applies sunscreen (SPF 30) diligently with reapplication. His father had some lesions removed from his face that was precancerous in nature.     Otherwise he is feeling well, without additional skin concerns at this time.    Past Medical History:   Patient Active Problem List   Diagnosis     Allergic rhinitis     Esophageal reflux     ACP (advance care planning)     Health Care Home     Male erectile disorder     Past Medical History:   Diagnosis Date     Gastroesophageal reflux disease 2014     Migraines 2018    Had only 3 occurances     Other acne      Past Surgical History:   Procedure Laterality Date     HC CREATE EARDRUM OPENING,GEN ANESTH       HC CREATE EARDRUM OPENING,GEN ANESTH       HC REMOVE TONSILS/ADENOIDS,<13 Y/O      T & A <14y.o.       Social History:   reports that he has never smoked. He has never used smokeless tobacco. He reports that he drinks about  "6.0 oz of alcohol per week. He reports that he does not use drugs.   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 Father      Myocardial Infarction Father      Hyperlipidemia Father      Cerebrovascular Disease Father         Mini strokes     Cardiovascular Brother         rhythym     Cancer Paternal Grandfather      Stomach Cancer Paternal Grandfather      Cancer - colorectal No family hx of      Prostate Cancer No family hx of    Father had \"precancerous\" lesions removed from the face.     Medications:  Current Outpatient Medications   Medication Sig Dispense Refill     adapalene (DIFFERIN) 0.1 % gel   0     cetirizine (ZYRTEC) 10 MG tablet Take by mouth every 24 hours       fluticasone (FLONASE) 50 MCG/ACT nasal spray Spray 2 sprays into both nostrils daily 1 Package 11     Multiple Vitamin (ONE-A-DAY MENS) TABS Take 1 tablet by mouth daily.       ranitidine (ZANTAC) 150 MG tablet Take 150 mg by mouth 2 times daily       tadalafil (CIALIS) 10 MG tablet Take 0.5-1 tablets (5-10 mg) by mouth daily as needed for erectile dysfunction Never use with nitroglycerin, terazosin or doxazosin. 20 tablet 3       Allergies   Allergen Reactions     Penicillin V        Review of Systems:  -As per HPI  -Constitutional: The patient denies fatigue, fevers, chills, unintended weight loss, and night sweats.  -HEENT: Patient denies nonhealing oral sores.  -Skin: As above in HPI. No additional skin concerns.    Physical exam:  Vitals: There were no vitals taken for this visit.  GEN: This is a well developed, well-nourished male in no acute distress, in a pleasant mood.    SKIN: Full skin, which includes the head/face, both arms, chest, back, abdomen,both legs, genitalia and/or groin buttocks, digits and/or nails, was examined.  -Multiple regular brown pigmented macules and papules are " identified on the trunk and extremities.   -There are a few light brown macules scattered to sun exposed areas.   -No other lesions of concern on areas examined.       Impression/Plan:  1. Multiple pigmented nevi on the trunk and extremities, some appearing slightly atypical on the abdomen, patient will monitor.     Continue to clinically monitor    Benign nature reassured, no further intervention required at this time. ABCDES discussed.   2. Solar lentigines.     Sun precaution was advised including the use of sun screens of SPF 30 or higher, sun protective clothing, and avoidance of tanning beds.      Follow-up in 1 year, earlier for new or changing lesions.       Staff Involved:  Staff/Scribe    Scribe Disclosure:  Jason HANSEN am serving as a scribe to document services personally performed by Rosalba Maya PA-C based on data collection and the provider's statements to me.     Provider Disclosure:   The documentation recorded by the scribe accurately reflects the services I personally performed and the decisions made by me.    All risks, benefits and alternatives were discussed with patient.  Patient is in agreement and understands the assessment and plan.  All questions were answered.  Sun Screen Education was given.   Return to Clinic annually or sooner as needed.   Rosalba Maya PA-C   [facility]

## 2019-08-14 NOTE — PATIENT INSTRUCTIONS
Quinn Mcneill,    Wonderful to meet you today. I think you're doing a great job with your fitness routine, so keep it up! Please let me know if you have any questions!    Khadar Arias, PhD

## 2019-08-14 NOTE — PROGRESS NOTES
Physical Fitness Assessment:    See Fitness Action Plan for information.    Khadar Arias, PhD.  Exercise Physiologist  Fitness

## 2019-08-15 LAB
EXPTIME-PRE: 8.9 SEC
FEF2575-%PRED-PRE: 77 %
FEF2575-PRE: 2.89 L/SEC
FEF2575-PRED: 3.75 L/SEC
FEFMAX-%PRED-PRE: 116 %
FEFMAX-PRE: 11.57 L/SEC
FEFMAX-PRED: 9.96 L/SEC
FEV1-%PRED-PRE: 105 %
FEV1-PRE: 4.22 L
FEV1FEV6-PRE: 72 %
FEV1FEV6-PRED: 81 %
FEV1FVC-PRE: 72 %
FEV1FVC-PRED: 80 %
FIFMAX-PRE: 9.55 L/SEC
FVC-%PRED-PRE: 115 %
FVC-PRE: 5.88 L
FVC-PRED: 5.08 L
INTERPRETATION ECG - MUSE: NORMAL

## 2019-08-15 NOTE — PROGRESS NOTES
AUDIOLOGY REPORT    SUBJECTIVE:  Osito Michelle is a 49 year old male who was seen in Audiology at the [facility] [place] for audiologic evaluation as part of the [facility] assessment.  The patient reports documented hearing loss confirmed during assessment approximately two years ago. The patient reports that he was told at that time that the left-ear hearing was poorer than the right, but the patient confirms that he does not note asymmetry in hearing between ears. The patient does note falling during wake boarding a few years ago, hitting the water hard on left side of head resulting in left eardrum perforation and so he does wonder if the additional hearing loss reported in the left ear was due to that injury. The patient does recall a history of chronic ear issues bilaterally as a child with 3 sets of ear ventilation tubes placed but does confirm that he has not suffered from middle ear issues as an adult beyond the left eardrum perforation due to injury. The patient reports a history of hearing issues in the family in that his mother, father and sister have all developed binaural hearing loss. The patient reports the presence of bilateral ringing tinnitus but denies other ear related issues, dizziness or vertigo or history of noise exposure.    OBJECTIVE:  Fall Risk Screen:  1. Have you fallen two or more times in the past year? No  2. Have you fallen and had an injury in the past year? No    Abuse Screening:  Do you feel unsafe at home or work/school? No  Do you feel threatened by someone? No  Does anyone try to keep you from having contact with others, or doing things outside of your home? No  Physical signs of abuse present? No    Otoscopic exam indicates ears are clear of cerumen bilaterally     Pure Tone Thresholds assessed using conventional audiometry with good  reliability from 250-8000 Hz bilaterally using circumaural headphones     RIGHT:  Normal sloping  to mild hearing loss, air-bone gap present 1000 Hz and 4000 Hz    LEFT:    Normal sloping to mild hearing loss, air-bone gap present 1000 Hz and 4000 Hz  *Symmetry is noted between ears for hearing thresholds    Tympanogram:    RIGHT: hypermobile eardrum mobility    LEFT:   hypermobile eardrum mobility    Reflexes (reported by stimulus ear):  RIGHT: Ipsilateral is present at normal levels  RIGHT: Contralateral is present at normal levels  LEFT:   Ipsilateral is present at normal levels  LEFT:   Contralateral is present at normal levels    Speech Reception Threshold:    RIGHT: 20 dB HL    LEFT:   20 dB HL  Word Recognition Score:     RIGHT: 100% at 65 dB HL using NU-6 recorded word list.    LEFT:   100% at 65 dB HL using NU-6 recorded word list.    ASSESSMENT:  Normal sloping to mild hearing loss bilaterally with symmetry noted between ears and with air-bone gaps present 1000 Hz and 4000 Hz bilaterally. Today s results were discussed with the patient in detail.     PLAN:    Patient was counseled regarding hearing loss and impact on communication.  Patient is not an ideal candidate for amplification at this time but can consider hearing aid trial if concerned for issues in communication.  It is recommended that the patient return at least bi-annually for monitoring of hearing status, sooner if changes in hearing or ear symptoms are noted.  Please call this clinic with questions regarding these results or recommendations.      Zeyad Chin.  Licensed Audiologist  [place] #[id number]

## 2019-08-15 NOTE — PROGRESS NOTES
Osito Michelle comes into clinic today at the request of Dr. KEELEY Good Ordering Provider for EKG.    This service provided today was under the supervising provider of the day Dr. KEELEY Good, who was available if needed.    Kami Molina

## 2019-09-09 DIAGNOSIS — N52.9 ERECTILE DYSFUNCTION, UNSPECIFIED ERECTILE DYSFUNCTION TYPE: ICD-10-CM

## 2019-09-09 DIAGNOSIS — N52.9 MALE ERECTILE DISORDER: ICD-10-CM

## 2019-09-09 RX ORDER — TADALAFIL 2.5 MG/1
2.5 TABLET ORAL DAILY PRN
Qty: 30 TABLET | Refills: 3 | Status: SHIPPED | OUTPATIENT
Start: 2019-09-09 | End: 2019-09-09

## 2019-09-09 RX ORDER — TADALAFIL 2.5 MG/1
2.5 TABLET ORAL DAILY PRN
Qty: 30 TABLET | Refills: 3 | Status: SHIPPED | OUTPATIENT
Start: 2019-09-09

## 2019-11-12 DIAGNOSIS — R10.13 DYSPEPSIA: ICD-10-CM

## 2019-11-12 PROCEDURE — 87338 HPYLORI STOOL AG IA: CPT | Performed by: INTERNAL MEDICINE

## 2019-11-13 DIAGNOSIS — R10.13 DYSPEPSIA: Primary | ICD-10-CM

## 2019-11-13 LAB — H PYLORI AG STL QL IA: NEGATIVE

## 2019-11-26 ENCOUNTER — TELEPHONE (OUTPATIENT)
Dept: GASTROENTEROLOGY | Facility: CLINIC | Age: 49
End: 2019-11-26

## 2020-08-25 DIAGNOSIS — N52.9 MALE ERECTILE DISORDER: ICD-10-CM

## 2020-08-25 DIAGNOSIS — N52.9 ERECTILE DYSFUNCTION, UNSPECIFIED ERECTILE DYSFUNCTION TYPE: ICD-10-CM

## 2020-08-27 NOTE — TELEPHONE ENCOUNTER
tadalafil (CIALIS) 2.5 MG tablet  Last Written Prescription Date:  9/9/19  Last Fill Quantity: 30,   # refills: 3  Last Office Visit : 8/14/19  Future Office visit:  none    Routing refill request to provider for review/approval because: lv 8/19  Rf? need annual appt? thanks.

## 2020-09-01 RX ORDER — TADALAFIL 2.5 MG/1
2.5 TABLET ORAL DAILY PRN
Qty: 30 TABLET | Refills: 3 | Status: CANCELLED | OUTPATIENT
Start: 2020-09-01

## 2020-11-17 ENCOUNTER — APPOINTMENT (OUTPATIENT)
Dept: GENERAL RADIOLOGY | Facility: CLINIC | Age: 50
End: 2020-11-17
Attending: PHYSICIAN ASSISTANT
Payer: COMMERCIAL

## 2020-11-17 ENCOUNTER — HOSPITAL ENCOUNTER (EMERGENCY)
Facility: CLINIC | Age: 50
Discharge: HOME | End: 2020-11-17
Attending: PHYSICIAN ASSISTANT | Admitting: PHYSICIAN ASSISTANT
Payer: COMMERCIAL

## 2020-11-17 VITALS
HEART RATE: 66 BPM | SYSTOLIC BLOOD PRESSURE: 124 MMHG | RESPIRATION RATE: 16 BRPM | TEMPERATURE: 97.9 F | DIASTOLIC BLOOD PRESSURE: 78 MMHG | OXYGEN SATURATION: 98 %

## 2020-11-17 DIAGNOSIS — R07.9 ACUTE CHEST PAIN: ICD-10-CM

## 2020-11-17 DIAGNOSIS — R91.8 PULMONARY NODULES: ICD-10-CM

## 2020-11-17 LAB
ALBUMIN SERPL-MCNC: 3.9 G/DL (ref 3.4–5)
ALP SERPL-CCNC: 63 U/L (ref 40–150)
ALT SERPL W P-5'-P-CCNC: 32 U/L (ref 0–70)
ANION GAP SERPL CALCULATED.3IONS-SCNC: 7 MMOL/L (ref 3–14)
AST SERPL W P-5'-P-CCNC: 21 U/L (ref 0–45)
BASOPHILS # BLD AUTO: 0 10E9/L (ref 0–0.2)
BASOPHILS NFR BLD AUTO: 0.2 %
BILIRUB SERPL-MCNC: 0.9 MG/DL (ref 0.2–1.3)
BUN SERPL-MCNC: 12 MG/DL (ref 7–30)
CALCIUM SERPL-MCNC: 9.7 MG/DL (ref 8.5–10.1)
CHLORIDE SERPL-SCNC: 108 MMOL/L (ref 94–109)
CO2 SERPL-SCNC: 26 MMOL/L (ref 20–32)
CREAT SERPL-MCNC: 1.01 MG/DL (ref 0.66–1.25)
DIFFERENTIAL METHOD BLD: NORMAL
EOSINOPHIL # BLD AUTO: 0 10E9/L (ref 0–0.7)
EOSINOPHIL NFR BLD AUTO: 0.5 %
ERYTHROCYTE [DISTWIDTH] IN BLOOD BY AUTOMATED COUNT: 12.9 % (ref 10–15)
GFR SERPL CREATININE-BSD FRML MDRD: 87 ML/MIN/{1.73_M2}
GLUCOSE SERPL-MCNC: 121 MG/DL (ref 70–99)
HCT VFR BLD AUTO: 42.2 % (ref 40–53)
HGB BLD-MCNC: 14.7 G/DL (ref 13.3–17.7)
IMM GRANULOCYTES # BLD: 0 10E9/L (ref 0–0.4)
IMM GRANULOCYTES NFR BLD: 0.2 %
INTERPRETATION ECG - MUSE: NORMAL
LYMPHOCYTES # BLD AUTO: 1.2 10E9/L (ref 0.8–5.3)
LYMPHOCYTES NFR BLD AUTO: 27.7 %
MCH RBC QN AUTO: 31.3 PG (ref 26.5–33)
MCHC RBC AUTO-ENTMCNC: 34.8 G/DL (ref 31.5–36.5)
MCV RBC AUTO: 90 FL (ref 78–100)
MONOCYTES # BLD AUTO: 0.5 10E9/L (ref 0–1.3)
MONOCYTES NFR BLD AUTO: 11.4 %
NEUTROPHILS # BLD AUTO: 2.5 10E9/L (ref 1.6–8.3)
NEUTROPHILS NFR BLD AUTO: 60 %
NRBC # BLD AUTO: 0 10*3/UL
NRBC BLD AUTO-RTO: 0 /100
PLATELET # BLD AUTO: 284 10E9/L (ref 150–450)
POTASSIUM SERPL-SCNC: 3.6 MMOL/L (ref 3.4–5.3)
PROT SERPL-MCNC: 7.1 G/DL (ref 6.8–8.8)
RBC # BLD AUTO: 4.7 10E12/L (ref 4.4–5.9)
SODIUM SERPL-SCNC: 141 MMOL/L (ref 133–144)
TROPONIN I SERPL-MCNC: <0.015 UG/L (ref 0–0.04)
TROPONIN I SERPL-MCNC: <0.015 UG/L (ref 0–0.04)
WBC # BLD AUTO: 4.2 10E9/L (ref 4–11)

## 2020-11-17 PROCEDURE — 85025 COMPLETE CBC W/AUTO DIFF WBC: CPT | Performed by: PHYSICIAN ASSISTANT

## 2020-11-17 PROCEDURE — 71046 X-RAY EXAM CHEST 2 VIEWS: CPT

## 2020-11-17 PROCEDURE — 80053 COMPREHEN METABOLIC PANEL: CPT | Performed by: PHYSICIAN ASSISTANT

## 2020-11-17 PROCEDURE — 99285 EMERGENCY DEPT VISIT HI MDM: CPT | Mod: 25

## 2020-11-17 PROCEDURE — 84484 ASSAY OF TROPONIN QUANT: CPT | Mod: 91 | Performed by: PHYSICIAN ASSISTANT

## 2020-11-17 PROCEDURE — 93005 ELECTROCARDIOGRAM TRACING: CPT

## 2020-11-17 ASSESSMENT — ENCOUNTER SYMPTOMS
VOMITING: 0
COUGH: 0
ABDOMINAL PAIN: 0
SHORTNESS OF BREATH: 0
NAUSEA: 0

## 2020-11-17 NOTE — ED TRIAGE NOTES
Chest discomfort today. Then worked out, excruciating pain, went down to his knees and called 911. No chest pain currently. COVID 3 weeks ago, reports not having symptoms anymore.

## 2020-11-17 NOTE — ED NOTES
Bed: ED24  Expected date: 11/17/20  Expected time: 12:12 PM  Means of arrival: Ambulance  Comments:  854 48m chest pain ETA 1222

## 2020-11-17 NOTE — ED AVS SNAPSHOT
[facility] Emergency Dept  [address] [address]  Phone: [phone]  Fax: [phone]                                    Osito Michelle   MRN: [id number]    Department: [facility] Emergency Dept   Date of Visit: 11/17/2020           After Visit Summary Signature Page    I have received my discharge instructions, and my questions have been answered. I have discussed any challenges I see with this plan with the nurse or doctor.    ..........................................................................................................................................  Patient/Patient Representative Signature      ..........................................................................................................................................  Patient Representative Print Name and Relationship to Patient    ..................................................               ................................................  Date                                   Time    ..........................................................................................................................................  Reviewed by Signature/Title    ...................................................              ..............................................  Date                                               Time          22EPIC Rev 08/18

## 2020-11-17 NOTE — ED PROVIDER NOTES
History     Chief Complaint:  Chest Pain       HPI   Osito Michelle is a 50 year old male who presents with chest pain. The patient states that he developed some mild mid chest pain while working at his computer this morning. He attributed the chest pain to acid reflux and tried taking Tums as well as Zantac without any significant improvement. He then went to do his chest workout and developed severe sharp mid chest pain that dropped him to his knees 30 minutes prior to arrival. He states that the chest pain went away immediately after it dropped him to his knees. He denies having chest pain like this in the past and called 911. Of note, the patient reports having covid earlier this year but has since recovered. Denies associated symptoms including shortness of breath, diaphoresis, nausea, vomiting. Denies radiation of the pain.     Cardiac/PE/DVT Risk Factors:  History of hypertension - no  History of hyperlipidemia - no  History of diabetes - no  History of smoking - no  Personal history of PE/DVT - no  Family history of PE/DVT - no  Family history of heart complications - no  Recent travel - no  Recent surgery - no  Other immobilizations - no  Cancer - no     Allergies:  Penicillin V     Medications:    Zantac     Past Medical History:    GERD  Migraines     Past Surgical History:    Create eardrum opening  T&A    Family History:    IBS  Heart disease  Coronary artery disease   MI  Hyperlipidemia    Cerebrovascular disease      Social History:  Smoking Status: Never Smoker  Smokeless Tobacco: Never Used  Alcohol Use: Yes  Drug Use: No  PCP: Nory Snider     Review of Systems   Respiratory: Negative for cough and shortness of breath.    Cardiovascular: Positive for chest pain.   Gastrointestinal: Negative for abdominal pain, nausea and vomiting.   All other systems reviewed and are negative.      Physical Exam     Patient Vitals for the past 24 hrs:   BP Temp Temp src Pulse Resp SpO2   11/17/20  1500 123/79 -- -- 72 16 --   11/17/20 1400 119/77 -- -- 65 20 --   11/17/20 1332 113/79 -- -- 61 16 99 %   11/17/20 1300 119/75 -- -- 66 11 100 %   11/17/20 1234 (!) 130/91 97.9  F (36.6  C) Oral 69 12 100 %        Physical Exam  General: Alert, interactive.   Head:  Scalp is atraumatic.  Eyes:  EOM intact. The pupils are equal, round, and reactive to light. No scleral icterus.   ENT:                                      Ears:  The external ears are normal.  Nose:  The external nose is normal.  Throat:  The oropharynx is normal. Mucus membranes are moist.                 Neck:  Normal range of motion. There is no rigidity.   CV:  Regular rate and rhythm. No murmur. 2+ radial pulses  Resp:  Breath sounds are clear bilaterally. Non-labored, no retractions or accessory muscle use.  GI:  Abdomen is soft, no distension, no tenderness.   MS:  Normal range of motion.   Skin:  Warm and dry.   Neuro:  Strength and sensation grossly intact.   Psych:  Awake. Alert.  Appropriate interactions.       Emergency Department Course   ECG:  ECG taken at 1427  Normal sinus rhythm  Rightward axis   Nonspecific T wave abnormality   Abnormal ECG  Rate 66 bpm. AR interval 164 ms. QRS duration 90 ms. QT/QTc 384/402 ms. P-R-T axes 78 91 8.   No significant change when compared to EKG dated 8/14/19.     Imaging:  Radiology findings were communicated with the patient who voiced understanding of the findings.    Chest XR,  PA & LAT  Final Result  IMPRESSION: PA and lateral views of the chest were obtained.  Cardiomediastinal silhouette is within normal limits. Small nodular  opacity projects over the lateral aspect of the right midlung, new as  compared to 4/1/2008, indeterminate, could be infectious, recommend  follow-up exam in 3 months to ensure resolution. No significant  pleural effusion or pneumothorax.  ESTEFANIA RUSSELL MD  Reading per radiology     Laboratory:  Laboratory findings were communicated with the patient who voiced  understanding of the findings.    Troponin(1447):  <0.015      Troponin(1238):  <0.015      CBC:  WBC 4.2, HGB 14.7, , o/w WNL     CMP: Glucose 121 (H), o/w WNL (Creatinine: 1.01)    Emergency Department Course:  Past medical records, nursing notes, and vitals reviewed.    1241 I performed an exam of the patient as documented above.    IV was inserted and blood was drawn for laboratory testing, results above.     The patient was sent for imaging while in the emergency department, results above.       1540 Patient rechecked and updated.       Findings and plan explained to the Patient. Patient discharged home with instructions regarding supportive care, medications, and reasons to return. The importance of close follow-up was reviewed.       Impression & Plan       HEART Score  Background  Calculates the overall risk of adverse event in patient's presenting with chest pain.  Based on 5 criteria (each assigned 0-2 points) including suspiciousness of history, EKG, age, risk factors and troponin.    Data  50 year old male  has Allergic rhinitis; Esophageal reflux; ACP (advance care planning); Health Care Home; Male erectile disorder; Status post tonsillectomy; S/P sinus surgery; History of migraine; and History of herpes zoster on their problem list.   reports that he has never smoked. He has never used smokeless tobacco.  family history includes C.A.D. in his father; Cancer in his paternal grandfather; Cardiovascular in his brother; Cerebrovascular Disease in his father; Heart Disease in his father; Hyperlipidemia in his father; Hypertension in his father; Irritable Bowel Syndrome in his mother; Myocardial Infarction in his father; Stomach Cancer in his paternal grandfather.  Lab Results   Component Value Date    TROPI <0.015 11/17/2020     Criteria   0-2 points for each of 5 items (maximum of 10 points):  Score 0- History slightly suspicious for coronary syndrome  Score 0- EKG Normal  Score 1- Age 45 to 65  years old  Score 1- One to 2 risk factors for atherosclerotic disease  Score 0- Within normal limits for troponin levels  Interpretation  Risk of adverse outcome  Heart Score: 2  Total Score 0-3- Adverse Outcome Risk 2.5% - Supports early discharge with appropriate follow-up       Medical Decision Making:  Osito Michelle is an otherwise healthy 50 year old male  presents to the emergency department today for evaluation of acute, sharp, chest pain that resolved upon arrival here.  Vitals normal on arrival. Patient is well appearing and normal exam.     Differential of chest pain is broad and includes aortic dissection, ACS, pulmonary embolism, pericarditis, myocarditis, pneumonia, pleural effusion, pneumothorax, musculoskeletal, GERD, esophageal spasm, anxiety, etc. Workup in the emergency department overall unremarkable. EKG shows normal sinus rhythm without acute ST changes. Initial and 2 hour delta troponin negative. Doubt ACS given sharp, acute chest pain that quickly resolved along with negative ED work. HEART score 2 supports discharge home with close follow up with primary care provider. No pleuritic chest pain or hypoxia and I doubt PE. Additionally, patient is PERC negative. The chest xray was reviewed and shows no evidence of pneumothorax, infiltrate to suggest pneumonia, widened mediastinum to suggest aortic dissection, obvious rib fracture or other acute findings.  Discussed incidental finding of pulmonary nodule on chest x-ray and plan to follow-up with primary care provider to discuss possible need for repeat imaging.    I discussed the negative work-up in the emergency department and plan for close follow-up with primary care provider.  Unclear source of pain, though suspect possible musculoskeletal, costochondritis, or esophageal spasm.  He understands importance of close follow-up and strict return precautions should chest pain worsen, shortness of breath develop, or any other new/concerning symptoms  occur.  Patient agrees with this plan all questions and concerns addressed prior to discharge home.      Discharge Diagnosis:    ICD-10-CM    1. Acute chest pain  R07.9    2. Pulmonary nodules  R91.8        Disposition:  Discharged to home.    Scribe Disclosure:  I, Johnny Gray, am serving as a scribe at 12:41 PM on 11/17/2020 to document services personally performed by Martita Lopez PA-C based on my observations and the provider's statements to me.      11/17/2020   Martita Lopez PA-C Luell, Amy Jolene, PA-C  11/17/20 1598

## 2020-11-17 NOTE — DISCHARGE INSTRUCTIONS
*You may resume diet and activities as tolerated.  *No new medications. Continue your current medications.    *Follow-up with your doctor for a recheck in 2-3 days for recheck and to discuss incidental finding of pulmonary nodule.   *Return if you develop difficulty in breathing, faint or feel like you will faint or become worse in any way.        Discharge Instructions  Chest Pain    You have been seen today for chest pain or discomfort.  At this time, your provider has found no signs that your chest pain is due to a serious or life-threatening condition, (or you have declined more testing and/or admission to the hospital). However, sometimes there is a serious problem that does not show up right away. Your evaluation today may not be complete and you may need further testing and evaluation.     Generally, every Emergency Department visit should have a follow-up clinic visit with either a primary or a specialty clinic/provider. Please follow-up as instructed by your emergency provider today.  Return to the Emergency Department if:  Your chest pain changes, gets worse, starts to happen more often, or comes with less activity.  You are newly short of breath.  You get very weak or tired.  You pass out or faint.  You have any new symptoms, like fever, cough, numb legs, or you cough up blood.  You have anything else that worries you.    Until you follow-up with your regular provider, please do the following:  Take one aspirin daily unless you have an allergy or are told not to by your provider.  If a stress test appointment has been made, go to the appointment.  If you have questions, contact your regular provider.  Follow-up with your regular provider/clinic as directed; this is very important.    If you were given a prescription for medicine here today, be sure to read all of the information (including the package insert) that comes with your prescription.  This will include important information about the medicine,  its side effects, and any warnings that you need to know about.  The pharmacist who fills the prescription can provide more information and answer questions you may have about the medicine.  If you have questions or concerns that the pharmacist cannot address, please call or return to the Emergency Department.       Remember that you can always come back to the Emergency Department if you are not able to see your regular provider in the amount of time listed above, if you get any new symptoms, or if there is anything that worries you.

## 2020-11-24 ENCOUNTER — MYC MEDICAL ADVICE (OUTPATIENT)
Dept: FAMILY MEDICINE | Facility: CLINIC | Age: 50
End: 2020-11-24

## 2022-04-11 ENCOUNTER — OFFICE VISIT (OUTPATIENT)
Dept: URGENT CARE | Facility: URGENT CARE | Age: 52
End: 2022-04-11
Payer: COMMERCIAL

## 2022-04-11 VITALS
OXYGEN SATURATION: 98 % | DIASTOLIC BLOOD PRESSURE: 90 MMHG | HEART RATE: 63 BPM | TEMPERATURE: 98.3 F | SYSTOLIC BLOOD PRESSURE: 143 MMHG

## 2022-04-11 DIAGNOSIS — S00.03XA CONTUSION OF FACE, SCALP AND NECK, INITIAL ENCOUNTER: ICD-10-CM

## 2022-04-11 DIAGNOSIS — S01.81XA FACIAL LACERATION, INITIAL ENCOUNTER: Primary | ICD-10-CM

## 2022-04-11 DIAGNOSIS — S10.93XA CONTUSION OF FACE, SCALP AND NECK, INITIAL ENCOUNTER: ICD-10-CM

## 2022-04-11 DIAGNOSIS — S00.83XA CONTUSION OF FACE, SCALP AND NECK, INITIAL ENCOUNTER: ICD-10-CM

## 2022-04-11 PROCEDURE — 99203 OFFICE O/P NEW LOW 30 MIN: CPT

## 2022-04-12 NOTE — PROGRESS NOTES
SUBJECTIVE:  CHIEF COMPLAINT: hit near rt eye with hockey stick.  No LOC or visual change.  Some bleeding from lateral corner of eye.    The injury occurred 2 hours ago.  Work related: no    OBJECTIVE:  EXAM:  Patient appears in alert and no acute distress distress.  VITALS: Blood pressure (!) 143/90, pulse 63, temperature 98.3  F (36.8  C), temperature source Oral, SpO2 98 %.  Examination of the rt eye/facial area:       Inspection: swelling seen, mild, echymoses seen, small amount and 1cm skin laceration in smile crease near lateral rt eye. Small lateral subconjunctival hemorrhage.       Palpation: mild palpable tenderness       Eye exam is normal other than small subconjunctival hemorrhage.  MARY, EOMs normal.  Visual acuity normal.  Globe is not tender and fundascopic exam normal    ASSESSMENT:  Contusion lateral rt orbit  Laceration 1cm well approximated without suture  Subconjunctival hemorrhage  PLAN:  Ice, expect more swelling and ecchymosis over the next two days.  Laceration should heal spontaneously  Follow up with primary care provider if no improvement.

## 2024-01-06 ENCOUNTER — HEALTH MAINTENANCE LETTER (OUTPATIENT)
Age: 54
End: 2024-01-06

## 2025-01-25 ENCOUNTER — HEALTH MAINTENANCE LETTER (OUTPATIENT)
Age: 55
End: 2025-01-25